# Patient Record
Sex: MALE | Race: WHITE
[De-identification: names, ages, dates, MRNs, and addresses within clinical notes are randomized per-mention and may not be internally consistent; named-entity substitution may affect disease eponyms.]

---

## 2019-02-15 ENCOUNTER — HOSPITAL ENCOUNTER (OUTPATIENT)
Dept: HOSPITAL 90 - SHCH | Age: 74
Discharge: HOME | End: 2019-02-15
Attending: INTERNAL MEDICINE
Payer: OTHER GOVERNMENT

## 2019-02-15 DIAGNOSIS — I10: Primary | ICD-10-CM

## 2019-02-15 DIAGNOSIS — E78.5: ICD-10-CM

## 2019-02-15 PROCEDURE — 93017 CV STRESS TEST TRACING ONLY: CPT

## 2019-02-15 PROCEDURE — 78452 HT MUSCLE IMAGE SPECT MULT: CPT

## 2019-02-15 PROCEDURE — 96374 THER/PROPH/DIAG INJ IV PUSH: CPT

## 2019-03-07 ENCOUNTER — HOSPITAL ENCOUNTER (INPATIENT)
Dept: HOSPITAL 90 - DAH | Age: 74
LOS: 8 days | Discharge: NURSING, REHAB OR LONG-TERM CARE | End: 2019-03-15
Payer: OTHER GOVERNMENT

## 2019-03-07 DIAGNOSIS — F17.200: ICD-10-CM

## 2019-03-07 DIAGNOSIS — I10: ICD-10-CM

## 2019-03-07 DIAGNOSIS — I25.119: Primary | ICD-10-CM

## 2019-03-08 PROCEDURE — B2131ZZ FLUOROSCOPY OF MULTIPLE CORONARY ARTERY BYPASS GRAFTS USING LOW OSMOLAR CONTRAST: ICD-10-PCS

## 2019-03-08 PROCEDURE — B2111ZZ FLUOROSCOPY OF MULTIPLE CORONARY ARTERIES USING LOW OSMOLAR CONTRAST: ICD-10-PCS

## 2019-03-08 PROCEDURE — 02120Z9 BYPASS CORONARY ARTERY, THREE ARTERIES FROM LEFT INTERNAL MAMMARY, OPEN APPROACH: ICD-10-PCS

## 2019-03-08 PROCEDURE — B2151ZZ FLUOROSCOPY OF LEFT HEART USING LOW OSMOLAR CONTRAST: ICD-10-PCS

## 2019-03-08 PROCEDURE — 4A023N7 MEASUREMENT OF CARDIAC SAMPLING AND PRESSURE, LEFT HEART, PERCUTANEOUS APPROACH: ICD-10-PCS

## 2019-03-08 PROCEDURE — 0210093 BYPASS CORONARY ARTERY, ONE ARTERY FROM CORONARY ARTERY WITH AUTOLOGOUS VENOUS TISSUE, OPEN APPROACH: ICD-10-PCS

## 2019-10-16 ENCOUNTER — HOSPITAL ENCOUNTER (OUTPATIENT)
Dept: HOSPITAL 90 - SHCH | Age: 74
Discharge: HOME | End: 2019-10-16
Attending: INTERNAL MEDICINE
Payer: OTHER GOVERNMENT

## 2019-10-16 DIAGNOSIS — I87.2: Primary | ICD-10-CM

## 2019-10-16 DIAGNOSIS — R59.0: ICD-10-CM

## 2019-10-16 PROCEDURE — 93970 EXTREMITY STUDY: CPT

## 2020-01-02 ENCOUNTER — HOSPITAL ENCOUNTER (OUTPATIENT)
Dept: HOSPITAL 90 - EDH | Age: 75
Setting detail: OBSERVATION
LOS: 1 days | Discharge: HOME | End: 2020-01-03
Attending: INTERNAL MEDICINE | Admitting: INTERNAL MEDICINE
Payer: OTHER GOVERNMENT

## 2020-01-02 VITALS — BODY MASS INDEX: 30.43 KG/M2 | HEIGHT: 68 IN | WEIGHT: 200.8 LBS

## 2020-01-02 DIAGNOSIS — Z95.0: ICD-10-CM

## 2020-01-02 DIAGNOSIS — M43.16: ICD-10-CM

## 2020-01-02 DIAGNOSIS — J44.9: ICD-10-CM

## 2020-01-02 DIAGNOSIS — E78.5: ICD-10-CM

## 2020-01-02 DIAGNOSIS — I10: ICD-10-CM

## 2020-01-02 DIAGNOSIS — Z79.51: ICD-10-CM

## 2020-01-02 DIAGNOSIS — Z79.899: ICD-10-CM

## 2020-01-02 DIAGNOSIS — E03.9: ICD-10-CM

## 2020-01-02 DIAGNOSIS — R07.89: Primary | ICD-10-CM

## 2020-01-02 DIAGNOSIS — R79.89: ICD-10-CM

## 2020-01-02 DIAGNOSIS — M79.674: ICD-10-CM

## 2020-01-02 DIAGNOSIS — I25.10: ICD-10-CM

## 2020-01-02 DIAGNOSIS — I48.0: ICD-10-CM

## 2020-01-02 DIAGNOSIS — Z79.01: ICD-10-CM

## 2020-01-02 DIAGNOSIS — Z95.1: ICD-10-CM

## 2020-01-02 LAB
ALBUMIN SERPL-MCNC: 3.8 G/DL (ref 3.5–5)
ALT SERPL-CCNC: 67 U/L (ref 12–78)
AMYLASE SERPL-CCNC: 22 U/L (ref 25–115)
APTT PPP: 27.8 SEC (ref 26.3–35.5)
AST SERPL-CCNC: 36 U/L (ref 10–37)
BASOPHILS NFR BLD AUTO: 0.8 % (ref 0–5)
BILIRUB SERPL-MCNC: 0.9 MG/DL (ref 0.2–1)
BNP SERPL-MCNC: 40 PG/ML (ref 0–100)
BUN SERPL-MCNC: 18 MG/DL (ref 7–18)
CHLORIDE SERPL-SCNC: 106 MMOL/L (ref 101–111)
CHOLEST SERPL-MCNC: 154 MG/DL (ref ?–200)
CK SERPL-CCNC: 74 U/L (ref 21–232)
CO2 SERPL-SCNC: 30 MMOL/L (ref 21–32)
CREAT SERPL-MCNC: 1.3 MG/DL (ref 0.5–1.5)
CRP SERPL HS-MCNC: 7.1 MG/L (ref 0–9)
EOSINOPHIL NFR BLD AUTO: 2.2 % (ref 0–8)
ERYTHROCYTE [DISTWIDTH] IN BLOOD BY AUTOMATED COUNT: 14.1 % (ref 11–15.5)
ERYTHROCYTE [SEDIMENTATION RATE] IN BLOOD BY WESTERGREN METHOD: 21 MM/HR (ref 0–20)
GFR SERPL CREATININE-BSD FRML MDRD: 58 ML/MIN (ref 60–?)
GLUCOSE SERPL-MCNC: 161 MG/DL (ref 70–105)
HCT VFR BLD AUTO: 39.5 % (ref 42–54)
HDLC SERPL-MCNC: 111 MG/DL (ref 29–71)
INR PPP: 0.98 (ref 0.85–1.15)
LDLC SERPL CALC-MCNC: 86 MG/DL (ref 0–99)
LIPASE SERPL-CCNC: 136 U/L (ref 114–286)
LYMPHOCYTES NFR SPEC AUTO: 21.3 % (ref 21–51)
MCH RBC QN AUTO: 28.1 PG (ref 27–33)
MCHC RBC AUTO-ENTMCNC: 33.4 G/DL (ref 32–36)
MCV RBC AUTO: 84 FL (ref 79–99)
MONOCYTES NFR BLD AUTO: 7.7 % (ref 3–13)
NEUTROPHILS NFR BLD AUTO: 67.6 % (ref 40–77)
NRBC BLD MANUAL-RTO: 0 % (ref 0–0.19)
PH UR STRIP: 5 [PH] (ref 5–8)
PLATELET # BLD AUTO: 175 K/UL (ref 130–400)
POTASSIUM SERPL-SCNC: 3.7 MMOL/L (ref 3.5–5.1)
PROT SERPL-MCNC: 7.1 G/DL (ref 6–8.3)
PROTHROMBIN TIME: 10.3 SEC (ref 9.6–11.6)
RBC # BLD AUTO: 4.7 MIL/UL (ref 4.5–6.2)
SODIUM SERPL-SCNC: 145 MMOL/L (ref 136–145)
SP GR UR STRIP: 1.02 (ref 1–1.03)
TRIGL SERPL-MCNC: 340 MG/DL (ref 30–200)
UROBILINOGEN UR STRIP-MCNC: 1 MG/DL (ref 0.2–1)
WBC # BLD AUTO: 7.7 K/UL (ref 4.8–10.8)

## 2020-01-02 PROCEDURE — 82550 ASSAY OF CK (CPK): CPT

## 2020-01-02 PROCEDURE — 85651 RBC SED RATE NONAUTOMATED: CPT

## 2020-01-02 PROCEDURE — 84145 PROCALCITONIN (PCT): CPT

## 2020-01-02 PROCEDURE — 96372 THER/PROPH/DIAG INJ SC/IM: CPT

## 2020-01-02 PROCEDURE — 71045 X-RAY EXAM CHEST 1 VIEW: CPT

## 2020-01-02 PROCEDURE — 99291 CRITICAL CARE FIRST HOUR: CPT

## 2020-01-02 PROCEDURE — 84484 ASSAY OF TROPONIN QUANT: CPT

## 2020-01-02 PROCEDURE — 84550 ASSAY OF BLOOD/URIC ACID: CPT

## 2020-01-02 PROCEDURE — 93005 ELECTROCARDIOGRAM TRACING: CPT

## 2020-01-02 PROCEDURE — 86140 C-REACTIVE PROTEIN: CPT

## 2020-01-02 PROCEDURE — 72131 CT LUMBAR SPINE W/O DYE: CPT

## 2020-01-02 PROCEDURE — 73660 X-RAY EXAM OF TOE(S): CPT

## 2020-01-02 PROCEDURE — 36415 COLL VENOUS BLD VENIPUNCTURE: CPT

## 2020-01-02 PROCEDURE — 83880 ASSAY OF NATRIURETIC PEPTIDE: CPT

## 2020-01-02 PROCEDURE — 85025 COMPLETE CBC W/AUTO DIFF WBC: CPT

## 2020-01-02 PROCEDURE — 80061 LIPID PANEL: CPT

## 2020-01-02 PROCEDURE — 81003 URINALYSIS AUTO W/O SCOPE: CPT

## 2020-01-02 PROCEDURE — 80053 COMPREHEN METABOLIC PANEL: CPT

## 2020-01-02 PROCEDURE — 96366 THER/PROPH/DIAG IV INF ADDON: CPT

## 2020-01-02 PROCEDURE — 82150 ASSAY OF AMYLASE: CPT

## 2020-01-02 PROCEDURE — 80048 BASIC METABOLIC PNL TOTAL CA: CPT

## 2020-01-02 PROCEDURE — 85610 PROTHROMBIN TIME: CPT

## 2020-01-02 PROCEDURE — 85730 THROMBOPLASTIN TIME PARTIAL: CPT

## 2020-01-02 PROCEDURE — 96365 THER/PROPH/DIAG IV INF INIT: CPT

## 2020-01-02 PROCEDURE — 83690 ASSAY OF LIPASE: CPT

## 2020-01-03 VITALS — SYSTOLIC BLOOD PRESSURE: 154 MMHG | DIASTOLIC BLOOD PRESSURE: 75 MMHG

## 2020-01-03 VITALS — SYSTOLIC BLOOD PRESSURE: 144 MMHG | DIASTOLIC BLOOD PRESSURE: 75 MMHG

## 2020-01-03 VITALS — SYSTOLIC BLOOD PRESSURE: 135 MMHG | DIASTOLIC BLOOD PRESSURE: 81 MMHG

## 2020-01-03 VITALS — DIASTOLIC BLOOD PRESSURE: 63 MMHG | SYSTOLIC BLOOD PRESSURE: 150 MMHG

## 2020-01-03 VITALS — DIASTOLIC BLOOD PRESSURE: 83 MMHG | SYSTOLIC BLOOD PRESSURE: 147 MMHG

## 2020-01-03 LAB
BASOPHILS NFR BLD AUTO: 0.5 % (ref 0–5)
BUN SERPL-MCNC: 23 MG/DL (ref 7–18)
CHLORIDE SERPL-SCNC: 108 MMOL/L (ref 101–111)
CO2 SERPL-SCNC: 31 MMOL/L (ref 21–32)
CREAT SERPL-MCNC: 1.1 MG/DL (ref 0.5–1.5)
EOSINOPHIL NFR BLD AUTO: 2.1 % (ref 0–8)
ERYTHROCYTE [DISTWIDTH] IN BLOOD BY AUTOMATED COUNT: 14.2 % (ref 11–15.5)
GFR SERPL CREATININE-BSD FRML MDRD: 70 ML/MIN (ref 60–?)
GLUCOSE SERPL-MCNC: 134 MG/DL (ref 70–105)
HCT VFR BLD AUTO: 37.8 % (ref 42–54)
LYMPHOCYTES NFR SPEC AUTO: 24.2 % (ref 21–51)
MCH RBC QN AUTO: 28.2 PG (ref 27–33)
MCHC RBC AUTO-ENTMCNC: 33.6 G/DL (ref 32–36)
MCV RBC AUTO: 84 FL (ref 79–99)
MONOCYTES NFR BLD AUTO: 6.9 % (ref 3–13)
NEUTROPHILS NFR BLD AUTO: 66.1 % (ref 40–77)
NRBC BLD MANUAL-RTO: 0 % (ref 0–0.19)
PLATELET # BLD AUTO: 177 K/UL (ref 130–400)
POTASSIUM SERPL-SCNC: 4.3 MMOL/L (ref 3.5–5.1)
RBC # BLD AUTO: 4.5 MIL/UL (ref 4.5–6.2)
SODIUM SERPL-SCNC: 146 MMOL/L (ref 136–145)
WBC # BLD AUTO: 8.3 K/UL (ref 4.8–10.8)

## 2020-01-03 RX ADMIN — Medication SCH MG: at 03:50

## 2020-01-03 RX ADMIN — CLINDAMYCIN PHOSPHATE SCH MLS/HR: 12 INJECTION, SOLUTION INTRAVENOUS at 09:46

## 2020-01-03 RX ADMIN — CLINDAMYCIN PHOSPHATE SCH MLS/HR: 12 INJECTION, SOLUTION INTRAVENOUS at 02:26

## 2020-01-03 RX ADMIN — CLINDAMYCIN PHOSPHATE SCH MLS/HR: 12 INJECTION, SOLUTION INTRAVENOUS at 14:52

## 2020-01-03 RX ADMIN — Medication SCH MG: at 09:43

## 2020-01-03 NOTE — NUR
DC

Pt discharged home as ordered by MD. In no distress. Appointment made for Lexiscan on 
January 15, 2020 at 0830 in [facility]. Pt informed, verbalized 
understanding.

## 2020-01-03 NOTE — NUR
AM NOTE

Awake, alert, and oriented x3. Denies any chest pain or shortness of breath. Updated on plan 
of care, verbalized understanding.

## 2020-01-13 ENCOUNTER — HOSPITAL ENCOUNTER (OUTPATIENT)
Dept: HOSPITAL 90 - SHCH | Age: 75
Discharge: HOME | End: 2020-01-13
Attending: INTERNAL MEDICINE
Payer: OTHER GOVERNMENT

## 2020-01-13 VITALS — BODY MASS INDEX: 30.07 KG/M2 | WEIGHT: 203 LBS | HEIGHT: 69 IN

## 2020-01-13 DIAGNOSIS — R11.0: ICD-10-CM

## 2020-01-13 DIAGNOSIS — R00.0: ICD-10-CM

## 2020-01-13 DIAGNOSIS — R07.9: Primary | ICD-10-CM

## 2020-01-13 PROCEDURE — 93017 CV STRESS TEST TRACING ONLY: CPT

## 2020-01-13 PROCEDURE — 96374 THER/PROPH/DIAG INJ IV PUSH: CPT

## 2020-01-13 PROCEDURE — 78452 HT MUSCLE IMAGE SPECT MULT: CPT

## 2020-04-03 ENCOUNTER — HOSPITAL ENCOUNTER (OUTPATIENT)
Dept: HOSPITAL 90 - RAH | Age: 75
Discharge: HOME | End: 2020-04-03
Attending: INTERNAL MEDICINE
Payer: OTHER GOVERNMENT

## 2020-04-03 DIAGNOSIS — M47.26: Primary | ICD-10-CM

## 2020-04-03 PROCEDURE — 72148 MRI LUMBAR SPINE W/O DYE: CPT
